# Patient Record
Sex: FEMALE | Race: WHITE | NOT HISPANIC OR LATINO | URBAN - METROPOLITAN AREA
[De-identification: names, ages, dates, MRNs, and addresses within clinical notes are randomized per-mention and may not be internally consistent; named-entity substitution may affect disease eponyms.]

---

## 2019-01-03 ENCOUNTER — EMERGENCY (EMERGENCY)
Facility: HOSPITAL | Age: 24
LOS: 1 days | Discharge: HOME | End: 2019-01-03
Attending: EMERGENCY MEDICINE | Admitting: EMERGENCY MEDICINE
Payer: COMMERCIAL

## 2019-01-03 VITALS
TEMPERATURE: 98 F | DIASTOLIC BLOOD PRESSURE: 97 MMHG | OXYGEN SATURATION: 97 % | HEIGHT: 64 IN | HEART RATE: 116 BPM | WEIGHT: 115.08 LBS | RESPIRATION RATE: 18 BRPM | SYSTOLIC BLOOD PRESSURE: 144 MMHG

## 2019-01-03 VITALS
DIASTOLIC BLOOD PRESSURE: 82 MMHG | TEMPERATURE: 98 F | OXYGEN SATURATION: 99 % | RESPIRATION RATE: 18 BRPM | SYSTOLIC BLOOD PRESSURE: 109 MMHG | HEART RATE: 72 BPM

## 2019-01-03 DIAGNOSIS — Z91.011 ALLERGY TO MILK PRODUCTS: ICD-10-CM

## 2019-01-03 DIAGNOSIS — T78.1XXA OTHER ADVERSE FOOD REACTIONS, NOT ELSEWHERE CLASSIFIED, INITIAL ENCOUNTER: ICD-10-CM

## 2019-01-03 DIAGNOSIS — Z79.899 OTHER LONG TERM (CURRENT) DRUG THERAPY: ICD-10-CM

## 2019-01-03 DIAGNOSIS — Z91.018 ALLERGY TO OTHER FOODS: ICD-10-CM
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
## 2019-01-03 DIAGNOSIS — Z91.010 ALLERGY TO PEANUTS: ICD-10-CM

## 2019-01-03 DIAGNOSIS — F41.9 ANXIETY DISORDER, UNSPECIFIED: ICD-10-CM

## 2019-01-03 DIAGNOSIS — Z91.013 ALLERGY TO SEAFOOD: ICD-10-CM
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
## 2019-01-03 PROCEDURE — 99284 EMERGENCY DEPT VISIT MOD MDM: CPT | Mod: 25

## 2019-01-03 PROCEDURE — 96374 THER/PROPH/DIAG INJ IV PUSH: CPT

## 2019-01-03 RX ORDER — SODIUM CHLORIDE 9 MG/ML
1000 INJECTION INTRAMUSCULAR; INTRAVENOUS; SUBCUTANEOUS ONCE
Qty: 0 | Refills: 0 | Status: COMPLETED | OUTPATIENT
Start: 2019-01-03 | End: 2019-01-03

## 2019-01-03 RX ORDER — GABAPENTIN 400 MG/1
1 CAPSULE ORAL
Qty: 0 | Refills: 0 | COMMUNITY

## 2019-01-03 RX ORDER — EPINEPHRINE 0.3 MG/.3ML
0.3 INJECTION INTRAMUSCULAR; SUBCUTANEOUS
Qty: 1 | Refills: 1 | OUTPATIENT
Start: 2019-01-03

## 2019-01-03 RX ORDER — FAMOTIDINE 10 MG/ML
20 INJECTION INTRAVENOUS ONCE
Qty: 0 | Refills: 0 | Status: COMPLETED | OUTPATIENT
Start: 2019-01-03 | End: 2019-01-03

## 2019-01-03 RX ORDER — FAMOTIDINE 10 MG/ML
40 INJECTION INTRAVENOUS
Qty: 0 | Refills: 0 | Status: DISCONTINUED | OUTPATIENT
Start: 2019-01-03 | End: 2019-01-03

## 2019-01-03 RX ORDER — DEXTROAMPHETAMINE SACCHARATE, AMPHETAMINE ASPARTATE, DEXTROAMPHETAMINE SULFATE AND AMPHETAMINE SULFATE 1.875; 1.875; 1.875; 1.875 MG/1; MG/1; MG/1; MG/1
1 TABLET ORAL
Qty: 0 | Refills: 0 | COMMUNITY

## 2019-01-03 RX ADMIN — FAMOTIDINE 20 MILLIGRAM(S): 10 INJECTION INTRAVENOUS at 02:42

## 2019-01-03 RX ADMIN — SODIUM CHLORIDE 2000 MILLILITER(S): 9 INJECTION INTRAMUSCULAR; INTRAVENOUS; SUBCUTANEOUS at 02:31

## 2019-01-03 NOTE — ED PROVIDER NOTE - PROGRESS NOTE DETAILS
Pt feels much better, no signs of allergic reaction. She was advised to take benadryl PRN. The patient is stable for DC. They were advised to call their PMD for prompt outpatient follow up. Return precautions were discussed. The patient was advised to return to the ER for any concerning or worsening symptoms.

## 2019-01-03 NOTE — ED PROVIDER NOTE - PHYSICAL EXAMINATION
GEN: Well appearing, well nourished, awake, alert, oriented to person, place, time/situation and extremely anxious and tearful  ENT: Airway patent, Nasal mucosa clear. Mouth with dry mucosa. Mild pharyngeal injection, no swelling, no dysphonia. Airway patent.  EYES: Clear bilaterally.  RESPIRATORY: Breathing comfortably with normal RR. No w/c/r.  CARDIAC: Regular rate and rhythm - initally sinus tach imporved now.  ABDOMEN: Soft, nontender, +bowel sounds, no rebound, rigidity, or guarding.  MSK: Range of motion is not limited, no deformities noted.  NEURO: Alert and oriented, no focal deficits.  SKIN: Skin normal color for race, warm, dry and intact. No evidence of rash. No hives  PSYCH: Alert and oriented to person, place, time/situation. very anxious. no apparent risk to self or others.

## 2019-01-03 NOTE — ED PROVIDER NOTE - CARE PLAN
Principal Discharge DX:	Allergic reaction Principal Discharge DX:	Allergic reaction  Secondary Diagnosis:	Anxiety

## 2019-01-03 NOTE — ED ADULT NURSE REASSESSMENT NOTE - NS ED NURSE REASSESS COMMENT FT1
Patient sitting up in stretcher with no signs or symptoms of distress noted.  Patient requesting to be d/c'd home since she is feeling well.  Discussed with Dr. Vega. Patient to be d/c'd approximately 3 hours of administration of epinephrine, which was given at 0140.

## 2019-01-03 NOTE — ED ADULT TRIAGE NOTE - CHIEF COMPLAINT QUOTE
after eating crabs @ 1:25am  today had diarrhea and feeling of closing her throat, had epipen @ 1:40 am and benadrly 3 caps

## 2019-01-03 NOTE — ED ADULT NURSE NOTE - CHPI ED NUR SYMPTOMS NEG
no congestion/no throat itching/no difficulty breathing/no difficulty swallowing/no wheezing/no nausea/no rash/no vomiting/no swelling of face, tongue

## 2019-01-03 NOTE — ED PROVIDER NOTE - OBJECTIVE STATEMENT
ate something with crab at 830pm  n/v/s, throat  epi at 1;40 and 3 benadryl 23F with a h.o food allergies who p/w n/v and sore throat 2-3 hours after she ate something with crab at 830pm. She became worried about her sx so mom administered an epi pen at approx 140pm. Pt also took 3 benadryl. She vomiting again so the came to the Er. Pt denies rash, she has no SOB or wheezing currently. No dizziness or syncope.

## 2019-01-03 NOTE — ED ADULT NURSE NOTE - NSIMPLEMENTINTERV_GEN_ALL_ED
Implemented All Universal Safety Interventions:  Glenn to call system. Call bell, personal items and telephone within reach. Instruct patient to call for assistance. Room bathroom lighting operational. Non-slip footwear when patient is off stretcher. Physically safe environment: no spills, clutter or unnecessary equipment. Stretcher in lowest position, wheels locked, appropriate side rails in place.

## 2019-07-24 NOTE — ED ADULT NURSE NOTE - NS PRO AD NO ADVANCE DIRECTIVE
No
FAMILY HISTORY:  Father  Still living? Unknown  Family history of cancer, Age at diagnosis: Age Unknown    Mother  Still living? Unknown  Family history of dementia, Age at diagnosis: Age Unknown    Grandparent  Still living? Unknown  Family history of cancer, Age at diagnosis: Age Unknown

## 2021-03-16 PROBLEM — F41.9 ANXIETY DISORDER, UNSPECIFIED: Chronic | Status: ACTIVE | Noted: 2019-01-03

## 2021-03-17 ENCOUNTER — APPOINTMENT (OUTPATIENT)
Age: 26
End: 2021-03-17
Payer: COMMERCIAL

## 2021-03-17 PROCEDURE — 0001A: CPT

## 2022-01-06 NOTE — ED PROVIDER NOTE - MEDICAL DECISION MAKING DETAILS
89 Pt with sx of n/v/ and sore throat/throat tightness after eating crab several hours earlier. Not classic for allergic reaction so may be food borne illness. Pt with no signs of anaphylaxis currently thus will not add steroids. Patient given pepcid and nausea medication as well as IVfs with great improvement. She was observed to ensure no return of sx once Epipen wore off. She feels much better and was DC'd with her mom with new rx for epipen. The patient is stable for DC. They were advised to call their PMD for prompt outpatient follow up. Return precautions were discussed. The patient was advised to return to the ER for any concerning or worsening symptoms.

## 2022-08-09 NOTE — ED ADULT NURSE NOTE - OBJECTIVE STATEMENT
August 9, 2022      [facility] [address] [place] [address]  Phone: [phone]  Fax: [phone]       Patient: Melissa Quintero   YOB: 1985  Date of Visit: 08/09/2022    To Whom It May Concern:    Priscilla Quintero  was at [facility] on 08/09/2022. The patient may return to work/school on 8/10//22 with no restrictions. If you have any questions or concerns, or if I can be of further assistance, please do not hesitate to contact me.    Sincerely,    AISSATOU Fernandez      Presents to ED with mother status post epinephrine and benadryl at home for possible allergic reaction to dinner she had approximately at 8pm.  Patient reports approximately 2hrs after eating she began to feel as if her throat was closing, she administered 3 benadryl OTC and was given her epipen into the right thigh (by her mother).  She shortly vomited after administration of the benadryl.  Upon arrival to the ED, she reports she is feeling slightly better.  No signs or symptoms of distress are noted at this time.  patient resting comfortably with mom at bedside.  Will continue to monitor closely.

## 2023-03-24 NOTE — ED ADULT NURSE NOTE - CAS EDN INTEG ASSESS
Resume miralax while waiting for results, call concerns.  Over the weekend if worse go to ER.   
WDL